# Patient Record
Sex: MALE | NOT HISPANIC OR LATINO | Employment: OTHER | URBAN - METROPOLITAN AREA
[De-identification: names, ages, dates, MRNs, and addresses within clinical notes are randomized per-mention and may not be internally consistent; named-entity substitution may affect disease eponyms.]

---

## 2022-08-05 ENCOUNTER — OFFICE VISIT (OUTPATIENT)
Dept: CARDIAC SURGERY | Facility: CLINIC | Age: 57
End: 2022-08-05

## 2022-08-05 VITALS
HEART RATE: 96 BPM | TEMPERATURE: 98.4 F | SYSTOLIC BLOOD PRESSURE: 130 MMHG | HEIGHT: 69 IN | BODY MASS INDEX: 24.2 KG/M2 | OXYGEN SATURATION: 99 % | DIASTOLIC BLOOD PRESSURE: 72 MMHG | WEIGHT: 163.4 LBS

## 2022-08-05 DIAGNOSIS — R59.0 MEDIASTINAL LYMPHADENOPATHY: ICD-10-CM

## 2022-08-05 DIAGNOSIS — R91.1 LUNG NODULE: Primary | ICD-10-CM

## 2022-08-05 DIAGNOSIS — Z00.6 EXAMINATION FOR NORMAL COMPARISON FOR CLINICAL RESEARCH: Primary | ICD-10-CM

## 2022-08-05 PROCEDURE — 99205 OFFICE O/P NEW HI 60 MIN: CPT | Performed by: THORACIC SURGERY (CARDIOTHORACIC VASCULAR SURGERY)

## 2022-08-05 RX ORDER — ASPIRIN 81 MG/1
81 TABLET, CHEWABLE ORAL DAILY
COMMUNITY

## 2022-08-05 NOTE — PROGRESS NOTES
2022  Patient Information  Wilner Senior                                                                                          [address] [place] [address]   1965  'PCP/Referring Physician'  Domenico Duran MD  [phone]  No ref. provider found    Chief Complaint   Patient presents with   • Consult     N/p per Dr. Mckenna for lung bx. Pt states that he had a PET scan that shows active spots in his chest here today for a consult       History of Present Illness:   The patient is a 57-year-old  male who is being referred for and abnormal PET scan. The patient had a screening for lung cancer due to his smoking history. He has been found to have mediastinal lymphadenopathy and small calcified and non calcified lung nodules. His PET scan is positive for hypermetabolic mediastinal hilar lymph nodes. He has a brother with a history of small cell and another brother who has gastric cancer. He has been referred for a biopsy at this time. He has multiple small nodules in his lungs and he is a smoker. He has smoked since the age of 15. He just recently quit, approximately 1 month ago. He denies, weight loss, hemoptysis or any other problems.     Patient Active Problem List   Diagnosis   • Lung nodule   • Mediastinal lymphadenopathy     Past Medical History:   Diagnosis Date   • Dyslipidemia    • Myocardial infarction (HCC)      Past Surgical History:   Procedure Laterality Date   • CORONARY ANGIOPLASTY     • CORONARY STENT PLACEMENT     • HERNIA REPAIR     • VASECTOMY         Current Outpatient Medications:   •  aspirin 81 MG chewable tablet, Chew 81 mg Daily., Disp: , Rfl:   No Known Allergies  Social History     Socioeconomic History   • Marital status:    Tobacco Use   • Smoking status: Former Smoker     Packs/day: 1.00     Years: 40.00     Pack years: 40.00     Quit date: 2022     Years since quittin.1   • Smokeless tobacco: Current User     Types: Chew   • Tobacco  "comment: Pt states that he has chewed tobacco ever since he was a small child   Substance and Sexual Activity   • Alcohol use: Yes     Alcohol/week: 1.0 standard drink     Types: 1 Cans of beer per week     Comment: 1 can of beer a daily   • Drug use: Never   • Sexual activity: Defer     Family History   Problem Relation Age of Onset   • Coronary artery disease Mother    • No Known Problems Father    • Cancer Brother      Review of Systems   Constitutional: Positive for malaise/fatigue. Negative for chills, decreased appetite, fever, weight gain and weight loss.   HENT: Negative for hearing loss.    Cardiovascular: Positive for dyspnea on exertion. Negative for chest pain, claudication, cyanosis, irregular heartbeat, leg swelling, near-syncope, orthopnea, palpitations, paroxysmal nocturnal dyspnea and syncope.   Endocrine: Negative for polydipsia, polyphagia and polyuria.   Hematologic/Lymphatic: Negative for adenopathy. Does not bruise/bleed easily.   Musculoskeletal: Positive for arthritis, back pain and muscle cramps (nuzhat horses). Negative for falls, gout, joint pain, joint swelling, muscle weakness and myalgias.   Gastrointestinal: Negative for abdominal pain, anorexia, dysphagia, heartburn, nausea and vomiting.   Genitourinary: Negative for dysuria and hematuria.   Neurological: Negative for dizziness, focal weakness, headaches, loss of balance, numbness, seizures, vertigo and weakness.   Psychiatric/Behavioral: Negative for altered mental status, depression, substance abuse and suicidal ideas. The patient is not nervous/anxious.    Allergic/Immunologic: Negative for HIV exposure and persistent infections.     Vitals:    08/05/22 0914   BP: 130/72   Pulse: 96   Temp: 98.4 °F (36.9 °C)   SpO2: 99%   Weight: 74.1 kg (163 lb 6.4 oz)   Height: 175.3 cm (69\")      Physical Exam   CONSTITUTIONAL:  Oriented x3, well-nourished, well developed, in no acute distress.  EYES:    Eyes anicteric.  EOMI.  PERRLA.   ENT:   "              Good dentition.  NECK:    Supple without masses or thyromegaly.  LUNGS:           Decreased in the bases; no wheezing, rales or rhonchi.  CARDIOVASCULAR:  Regular rate and rhythm without murmur, rub or gallop.  There are no carotid bruits.  GI:     Abdomen is soft, nontender, nondistended with normal bowel sounds.  No hepatosplenomegaly and no masses.  EXTREMITIES:   No cyanosis, clubbing or edema.  SKIN:   No ulcerations, petechiae or bruising.  MUSCULOSKELETAL:  Full range of motion with no deformity of extremities.  NEURO:  Cranial nerves II-XII, motor and sensory exams are intact.  PSYCH:  Alert and oriented; mood and affect good.    The ROS, past medical history, surgical history, family history, social history and vitals were reviewed by myself and corrected as needed.      Labs/Imaging:  I have obtained and reviewed the medical records from Dr. Mcgowan, including the PET scan demonstrating mediastinal lymphadenopathy.     Assessment/Plan:   The patient is a 57-year-old  male who presents mediastinal lymphadenopathy, which is hypermetabolic on PET scan. He has a strong family history of lung cancer in his uncles and a brother. I think in view of his history and hypermetabolic lymph nodes that we need to proceed with an EBUS procedure. I have explained the procedure, risks and alternatives. He appears to be fully informed and desires to proceed.     Patient Active Problem List   Diagnosis   • Lung nodule   • Mediastinal lymphadenopathy       CC: MD Valerie Alas MD Regina Fugate editing for Billy Quiles MD      I, Billy Quiles MD, have read and agree with the editing done by Dipika Estrada, .

## 2022-08-09 ENCOUNTER — HOSPITAL ENCOUNTER (OUTPATIENT)
Facility: HOSPITAL | Age: 57
Setting detail: HOSPITAL OUTPATIENT SURGERY
End: 2022-08-09
Attending: THORACIC SURGERY (CARDIOTHORACIC VASCULAR SURGERY) | Admitting: THORACIC SURGERY (CARDIOTHORACIC VASCULAR SURGERY)

## 2022-08-16 ENCOUNTER — PREP FOR SURGERY (OUTPATIENT)
Dept: OTHER | Facility: HOSPITAL | Age: 57
End: 2022-08-16

## 2022-08-16 DIAGNOSIS — R91.1 LUNG NODULE: Primary | ICD-10-CM

## 2022-08-22 ENCOUNTER — HOSPITAL ENCOUNTER (OUTPATIENT)
Dept: GENERAL RADIOLOGY | Facility: HOSPITAL | Age: 57
Discharge: HOME | End: 2022-08-22

## 2022-08-22 ENCOUNTER — PRE-ADMISSION TESTING (OUTPATIENT)
Dept: PREADMISSION TESTING | Facility: HOSPITAL | Age: 57
End: 2022-08-22

## 2022-08-22 VITALS — HEIGHT: 70 IN | BODY MASS INDEX: 23.7 KG/M2 | WEIGHT: 165.57 LBS

## 2022-08-22 DIAGNOSIS — R91.1 LUNG NODULE: ICD-10-CM

## 2022-08-22 LAB
ALBUMIN SERPL-MCNC: 4.3 G/DL (ref 3.5–5.2)
ALBUMIN/GLOB SERPL: 1.3 G/DL
ALP SERPL-CCNC: 87 U/L (ref 39–117)
ALT SERPL W P-5'-P-CCNC: 32 U/L (ref 1–41)
ANION GAP SERPL CALCULATED.3IONS-SCNC: 11 MMOL/L (ref 5–15)
APTT PPP: 41.7 SECONDS (ref 22–39)
AST SERPL-CCNC: 16 U/L (ref 1–40)
BILIRUB SERPL-MCNC: 0.4 MG/DL (ref 0–1.2)
BUN SERPL-MCNC: 16 MG/DL (ref 6–20)
BUN/CREAT SERPL: 13.8 (ref 7–25)
CALCIUM SPEC-SCNC: 9.6 MG/DL (ref 8.6–10.5)
CHLORIDE SERPL-SCNC: 103 MMOL/L (ref 98–107)
CO2 SERPL-SCNC: 26 MMOL/L (ref 22–29)
CREAT SERPL-MCNC: 1.16 MG/DL (ref 0.76–1.27)
DEPRECATED RDW RBC AUTO: 39.8 FL (ref 37–54)
EGFRCR SERPLBLD CKD-EPI 2021: 73.5 ML/MIN/1.73
ERYTHROCYTE [DISTWIDTH] IN BLOOD BY AUTOMATED COUNT: 12.1 % (ref 12.3–15.4)
GLOBULIN UR ELPH-MCNC: 3.4 GM/DL
GLUCOSE SERPL-MCNC: 88 MG/DL (ref 65–99)
HBA1C MFR BLD: 5.3 % (ref 4.8–5.6)
HCT VFR BLD AUTO: 41.9 % (ref 37.5–51)
HGB BLD-MCNC: 14.8 G/DL (ref 13–17.7)
INR PPP: 1.01 (ref 0.84–1.13)
MCH RBC QN AUTO: 31.8 PG (ref 26.6–33)
MCHC RBC AUTO-ENTMCNC: 35.3 G/DL (ref 31.5–35.7)
MCV RBC AUTO: 89.9 FL (ref 79–97)
PLATELET # BLD AUTO: 233 10*3/MM3 (ref 140–450)
PMV BLD AUTO: 8.5 FL (ref 6–12)
POTASSIUM SERPL-SCNC: 3.8 MMOL/L (ref 3.5–5.2)
PROT SERPL-MCNC: 7.7 G/DL (ref 6–8.5)
PROTHROMBIN TIME: 13.2 SECONDS (ref 11.4–14.4)
QT INTERVAL: 362 MS
QTC INTERVAL: 404 MS
RBC # BLD AUTO: 4.66 10*6/MM3 (ref 4.14–5.8)
SODIUM SERPL-SCNC: 140 MMOL/L (ref 136–145)
WBC NRBC COR # BLD: 7.74 10*3/MM3 (ref 3.4–10.8)

## 2022-08-22 PROCEDURE — 71046 X-RAY EXAM CHEST 2 VIEWS: CPT

## 2022-08-22 PROCEDURE — 85027 COMPLETE CBC AUTOMATED: CPT

## 2022-08-22 PROCEDURE — 85730 THROMBOPLASTIN TIME PARTIAL: CPT

## 2022-08-22 PROCEDURE — 93005 ELECTROCARDIOGRAM TRACING: CPT

## 2022-08-22 PROCEDURE — 83036 HEMOGLOBIN GLYCOSYLATED A1C: CPT

## 2022-08-22 PROCEDURE — 85610 PROTHROMBIN TIME: CPT

## 2022-08-22 PROCEDURE — 80053 COMPREHEN METABOLIC PANEL: CPT

## 2022-08-22 PROCEDURE — 93010 ELECTROCARDIOGRAM REPORT: CPT | Performed by: INTERNAL MEDICINE

## 2022-08-22 PROCEDURE — 36415 COLL VENOUS BLD VENIPUNCTURE: CPT

## 2022-08-22 NOTE — PAT
An arrival time for procedure was not given during PAT visit. If patient had any questions or concerns about their arrival time, they were instructed to contact their surgeon/physician.  Additionally, if the patient referred to an arrival time that was acquired from their my chart account, patient was encouraged to verify that time with their surgeon/physician.  NO arrival times given in Pre Admission Testing Department.

## 2022-08-24 ENCOUNTER — ANESTHESIA (OUTPATIENT)
Dept: GASTROENTEROLOGY | Facility: HOSPITAL | Age: 57
End: 2022-08-24

## 2022-08-24 ENCOUNTER — ANESTHESIA EVENT (OUTPATIENT)
Dept: GASTROENTEROLOGY | Facility: HOSPITAL | Age: 57
End: 2022-08-24

## 2022-08-24 RX ORDER — SODIUM CHLORIDE 0.9 % (FLUSH) 0.9 %
10 SYRINGE (ML) INJECTION AS NEEDED
Status: CANCELLED | OUTPATIENT
Start: 2022-08-24

## 2022-08-24 RX ORDER — SODIUM CHLORIDE, SODIUM LACTATE, POTASSIUM CHLORIDE, CALCIUM CHLORIDE 600; 310; 30; 20 MG/100ML; MG/100ML; MG/100ML; MG/100ML
9 INJECTION, SOLUTION INTRAVENOUS CONTINUOUS
Status: CANCELLED | OUTPATIENT
Start: 2022-08-24

## 2022-08-24 RX ORDER — FAMOTIDINE 10 MG/ML
20 INJECTION, SOLUTION INTRAVENOUS ONCE
Status: CANCELLED | OUTPATIENT
Start: 2022-08-24 | End: 2022-08-24

## 2022-08-24 RX ORDER — FAMOTIDINE 20 MG/1
20 TABLET, FILM COATED ORAL ONCE
Status: CANCELLED | OUTPATIENT
Start: 2022-08-24 | End: 2022-08-24

## 2022-08-24 RX ORDER — MIDAZOLAM HYDROCHLORIDE 1 MG/ML
1 INJECTION INTRAMUSCULAR; INTRAVENOUS
Status: CANCELLED | OUTPATIENT
Start: 2022-08-24

## 2022-08-24 RX ORDER — SODIUM CHLORIDE 0.9 % (FLUSH) 0.9 %
10 SYRINGE (ML) INJECTION EVERY 12 HOURS SCHEDULED
Status: CANCELLED | OUTPATIENT
Start: 2022-08-24

## 2022-08-24 RX ORDER — LIDOCAINE HYDROCHLORIDE 10 MG/ML
0.5 INJECTION, SOLUTION EPIDURAL; INFILTRATION; INTRACAUDAL; PERINEURAL ONCE AS NEEDED
Status: CANCELLED | OUTPATIENT
Start: 2022-08-24

## 2022-08-25 ENCOUNTER — HOSPITAL ENCOUNTER (OUTPATIENT)
Facility: HOSPITAL | Age: 57
Setting detail: HOSPITAL OUTPATIENT SURGERY
Discharge: HOME | End: 2022-08-25
Attending: STUDENT IN AN ORGANIZED HEALTH CARE EDUCATION/TRAINING PROGRAM | Admitting: STUDENT IN AN ORGANIZED HEALTH CARE EDUCATION/TRAINING PROGRAM

## 2022-08-25 ENCOUNTER — APPOINTMENT (OUTPATIENT)
Dept: GENERAL RADIOLOGY | Facility: HOSPITAL | Age: 57
End: 2022-08-25

## 2022-08-25 VITALS
SYSTOLIC BLOOD PRESSURE: 157 MMHG | DIASTOLIC BLOOD PRESSURE: 93 MMHG | TEMPERATURE: 97.1 F | RESPIRATION RATE: 16 BRPM | HEART RATE: 84 BPM | OXYGEN SATURATION: 97 %

## 2022-08-25 DIAGNOSIS — R91.1 LUNG NODULE: ICD-10-CM

## 2022-08-25 PROCEDURE — 31653 BRONCH EBUS SAMPLNG 3/> NODE: CPT | Performed by: STUDENT IN AN ORGANIZED HEALTH CARE EDUCATION/TRAINING PROGRAM

## 2022-08-25 PROCEDURE — 88333 PATH CONSLTJ SURG CYTO XM 1: CPT | Performed by: STUDENT IN AN ORGANIZED HEALTH CARE EDUCATION/TRAINING PROGRAM

## 2022-08-25 PROCEDURE — 25010000002 DEXAMETHASONE PER 1 MG: Performed by: NURSE ANESTHETIST, CERTIFIED REGISTERED

## 2022-08-25 PROCEDURE — 25010000002 PROPOFOL 10 MG/ML EMULSION: Performed by: NURSE ANESTHETIST, CERTIFIED REGISTERED

## 2022-08-25 PROCEDURE — 25010000002 FENTANYL CITRATE (PF) 50 MCG/ML SOLUTION: Performed by: NURSE ANESTHETIST, CERTIFIED REGISTERED

## 2022-08-25 PROCEDURE — S0260 H&P FOR SURGERY: HCPCS | Performed by: STUDENT IN AN ORGANIZED HEALTH CARE EDUCATION/TRAINING PROGRAM

## 2022-08-25 PROCEDURE — 71045 X-RAY EXAM CHEST 1 VIEW: CPT

## 2022-08-25 PROCEDURE — 88305 TISSUE EXAM BY PATHOLOGIST: CPT | Performed by: STUDENT IN AN ORGANIZED HEALTH CARE EDUCATION/TRAINING PROGRAM

## 2022-08-25 PROCEDURE — 25010000002 ONDANSETRON PER 1 MG: Performed by: NURSE ANESTHETIST, CERTIFIED REGISTERED

## 2022-08-25 PROCEDURE — C1726 CATH, BAL DIL, NON-VASCULAR: HCPCS | Performed by: STUDENT IN AN ORGANIZED HEALTH CARE EDUCATION/TRAINING PROGRAM

## 2022-08-25 PROCEDURE — 88172 CYTP DX EVAL FNA 1ST EA SITE: CPT | Performed by: STUDENT IN AN ORGANIZED HEALTH CARE EDUCATION/TRAINING PROGRAM

## 2022-08-25 RX ORDER — FAMOTIDINE 10 MG/ML
20 INJECTION, SOLUTION INTRAVENOUS ONCE
Status: COMPLETED | OUTPATIENT
Start: 2022-08-25 | End: 2022-08-25

## 2022-08-25 RX ORDER — SODIUM CHLORIDE 0.9 % (FLUSH) 0.9 %
10 SYRINGE (ML) INJECTION EVERY 12 HOURS SCHEDULED
Status: DISCONTINUED | OUTPATIENT
Start: 2022-08-25 | End: 2022-08-25 | Stop reason: HOSPADM

## 2022-08-25 RX ORDER — SODIUM CHLORIDE, SODIUM LACTATE, POTASSIUM CHLORIDE, CALCIUM CHLORIDE 600; 310; 30; 20 MG/100ML; MG/100ML; MG/100ML; MG/100ML
9 INJECTION, SOLUTION INTRAVENOUS CONTINUOUS
Status: CANCELLED | OUTPATIENT
Start: 2022-08-25

## 2022-08-25 RX ORDER — ONDANSETRON 2 MG/ML
INJECTION INTRAMUSCULAR; INTRAVENOUS AS NEEDED
Status: DISCONTINUED | OUTPATIENT
Start: 2022-08-25 | End: 2022-08-25 | Stop reason: SURG

## 2022-08-25 RX ORDER — SODIUM CHLORIDE 0.9 % (FLUSH) 0.9 %
10 SYRINGE (ML) INJECTION AS NEEDED
Status: DISCONTINUED | OUTPATIENT
Start: 2022-08-25 | End: 2022-08-25

## 2022-08-25 RX ORDER — HYDROMORPHONE HYDROCHLORIDE 1 MG/ML
0.5 INJECTION, SOLUTION INTRAMUSCULAR; INTRAVENOUS; SUBCUTANEOUS
Status: CANCELLED | OUTPATIENT
Start: 2022-08-25

## 2022-08-25 RX ORDER — SODIUM CHLORIDE 0.9 % (FLUSH) 0.9 %
10 SYRINGE (ML) INJECTION AS NEEDED
Status: DISCONTINUED | OUTPATIENT
Start: 2022-08-25 | End: 2022-08-25 | Stop reason: HOSPADM

## 2022-08-25 RX ORDER — SODIUM CHLORIDE 0.9 % (FLUSH) 0.9 %
10 SYRINGE (ML) INJECTION EVERY 12 HOURS SCHEDULED
Status: CANCELLED | OUTPATIENT
Start: 2022-08-25

## 2022-08-25 RX ORDER — LIDOCAINE HYDROCHLORIDE 10 MG/ML
0.5 INJECTION, SOLUTION EPIDURAL; INFILTRATION; INTRACAUDAL; PERINEURAL ONCE AS NEEDED
Status: CANCELLED | OUTPATIENT
Start: 2022-08-25

## 2022-08-25 RX ORDER — FENTANYL CITRATE 50 UG/ML
50 INJECTION, SOLUTION INTRAMUSCULAR; INTRAVENOUS
Status: CANCELLED | OUTPATIENT
Start: 2022-08-25

## 2022-08-25 RX ORDER — MIDAZOLAM HYDROCHLORIDE 1 MG/ML
1 INJECTION INTRAMUSCULAR; INTRAVENOUS
Status: DISCONTINUED | OUTPATIENT
Start: 2022-08-25 | End: 2022-08-25

## 2022-08-25 RX ORDER — FAMOTIDINE 20 MG/1
20 TABLET, FILM COATED ORAL ONCE
Status: CANCELLED | OUTPATIENT
Start: 2022-08-25 | End: 2022-08-25

## 2022-08-25 RX ORDER — SODIUM CHLORIDE 9 MG/ML
9 INJECTION, SOLUTION INTRAVENOUS CONTINUOUS
Status: DISCONTINUED | OUTPATIENT
Start: 2022-08-25 | End: 2022-08-25 | Stop reason: HOSPADM

## 2022-08-25 RX ORDER — MIDAZOLAM HYDROCHLORIDE 1 MG/ML
1 INJECTION INTRAMUSCULAR; INTRAVENOUS
Status: DISCONTINUED | OUTPATIENT
Start: 2022-08-25 | End: 2022-08-25 | Stop reason: SDUPTHER

## 2022-08-25 RX ORDER — LIDOCAINE HYDROCHLORIDE 10 MG/ML
0.5 INJECTION, SOLUTION EPIDURAL; INFILTRATION; INTRACAUDAL; PERINEURAL ONCE AS NEEDED
Status: DISCONTINUED | OUTPATIENT
Start: 2022-08-25 | End: 2022-08-25 | Stop reason: HOSPADM

## 2022-08-25 RX ORDER — FENTANYL CITRATE 50 UG/ML
INJECTION, SOLUTION INTRAMUSCULAR; INTRAVENOUS AS NEEDED
Status: DISCONTINUED | OUTPATIENT
Start: 2022-08-25 | End: 2022-08-25 | Stop reason: SURG

## 2022-08-25 RX ORDER — PROPOFOL 10 MG/ML
VIAL (ML) INTRAVENOUS AS NEEDED
Status: DISCONTINUED | OUTPATIENT
Start: 2022-08-25 | End: 2022-08-25 | Stop reason: SURG

## 2022-08-25 RX ORDER — LIDOCAINE HYDROCHLORIDE 10 MG/ML
INJECTION, SOLUTION EPIDURAL; INFILTRATION; INTRACAUDAL; PERINEURAL AS NEEDED
Status: DISCONTINUED | OUTPATIENT
Start: 2022-08-25 | End: 2022-08-25 | Stop reason: SURG

## 2022-08-25 RX ORDER — EPHEDRINE SULFATE 50 MG/ML
INJECTION, SOLUTION INTRAVENOUS AS NEEDED
Status: DISCONTINUED | OUTPATIENT
Start: 2022-08-25 | End: 2022-08-25 | Stop reason: SURG

## 2022-08-25 RX ORDER — ROCURONIUM BROMIDE 10 MG/ML
INJECTION, SOLUTION INTRAVENOUS AS NEEDED
Status: DISCONTINUED | OUTPATIENT
Start: 2022-08-25 | End: 2022-08-25 | Stop reason: SURG

## 2022-08-25 RX ORDER — DEXAMETHASONE SODIUM PHOSPHATE 4 MG/ML
INJECTION, SOLUTION INTRA-ARTICULAR; INTRALESIONAL; INTRAMUSCULAR; INTRAVENOUS; SOFT TISSUE AS NEEDED
Status: DISCONTINUED | OUTPATIENT
Start: 2022-08-25 | End: 2022-08-25 | Stop reason: SURG

## 2022-08-25 RX ADMIN — PROPOFOL 150 MG: 10 INJECTION, EMULSION INTRAVENOUS at 11:05

## 2022-08-25 RX ADMIN — Medication 10 ML: at 09:23

## 2022-08-25 RX ADMIN — FENTANYL CITRATE 100 MCG: 50 INJECTION, SOLUTION INTRAMUSCULAR; INTRAVENOUS at 11:05

## 2022-08-25 RX ADMIN — EPHEDRINE SULFATE 15 MG: 50 INJECTION, SOLUTION INTRAVENOUS at 11:48

## 2022-08-25 RX ADMIN — ONDANSETRON 4 MG: 2 INJECTION INTRAMUSCULAR; INTRAVENOUS at 11:09

## 2022-08-25 RX ADMIN — FAMOTIDINE 20 MG: 10 INJECTION INTRAVENOUS at 09:23

## 2022-08-25 RX ADMIN — DEXAMETHASONE SODIUM PHOSPHATE 4 MG: 4 INJECTION, SOLUTION INTRA-ARTICULAR; INTRALESIONAL; INTRAMUSCULAR; INTRAVENOUS; SOFT TISSUE at 11:09

## 2022-08-25 RX ADMIN — SUGAMMADEX 200 MG: 100 INJECTION, SOLUTION INTRAVENOUS at 12:24

## 2022-08-25 RX ADMIN — ROCURONIUM BROMIDE 50 MG: 50 INJECTION, SOLUTION INTRAVENOUS at 11:05

## 2022-08-25 RX ADMIN — LIDOCAINE HYDROCHLORIDE 50 MG: 10 INJECTION, SOLUTION EPIDURAL; INFILTRATION; INTRACAUDAL; PERINEURAL at 11:05

## 2022-08-25 RX ADMIN — EPHEDRINE SULFATE 10 MG: 50 INJECTION, SOLUTION INTRAVENOUS at 12:15

## 2022-08-25 RX ADMIN — SODIUM CHLORIDE 9 ML/HR: 9 INJECTION, SOLUTION INTRAVENOUS at 09:23

## 2022-08-25 RX ADMIN — ROCURONIUM BROMIDE 10 MG: 50 INJECTION, SOLUTION INTRAVENOUS at 12:20

## 2022-08-25 NOTE — BRIEF OP NOTE
BRONCHOSCOPY WITH ENDOBRONCHIAL ULTRASOUND  Progress Note    Wilner Senior  8/25/2022    Pre-op Diagnosis:   Lung nodule [R91.1]       Post-Op Diagnosis Codes:     * Lung nodule [R91.1]    Procedure/CPT® Codes:        Procedure(s):  BRONCHOSCOPY WITH ENDOBRONCHIAL ULTRASOUND    Surgeon(s):  Bay Mcgowan MD    Anesthesia: General    Staff:   Circulator: Marisa Joy RN  Endo Technician: Kiya Daily         Estimated Blood Loss: minimal    Urine Voided: * No values recorded between 8/25/2022 11:00 AM and 8/25/2022 11:07 AM *    Specimens:                A: 10L   11R   7  4L  4R  2R  10R          Drains: * No LDAs found *    Findings: Benign LNs on frozen section        Complications: none          Bay Mcgowan MD     Date: 8/25/2022  Time: 11:07 EDT

## 2022-08-25 NOTE — OP NOTE
Date of procedure August 25, 2022    Preoperative diagnosis  Smoking style lymphadenopathy  Lung nodules  PET avid hypermetabolic mediastinal hilar lymph nodes    Postoperative diagnosis  Same    Procedure  Endobronchial ultrasound  Mediastinal lymph node biopsies    Surgeon  Bay Mcgowan MD    Assistant  None    Estimated blood loss  Minimal    Complications  None    Indications  This is a 57-year-old  man with a substantial history of smoking who was referred to my partner Billy Quiles MD with concern for new lung nodules and PET avid hypermetabolic mediastinal hilar lymph nodes.  He has a strong family history of cancer.  Due to concern for cancer, the recommendation was made for the patient undergo mediastinal lymph node staging with endobronchial ultrasound.  I explained the risks, benefits, and alternatives of the procedure including risks of bleeding, infection, lung injury, permanent disability, and even death.  The patient demonstrated good understanding and signed written consent.    Operative findings  Normal bronchoscopy without endoluminal mass or bleeding.  Endobronchial ultrasound was performed and yielded multiple samples of benign lymph node tissue on frozen section analysis by pathology from stations 10L, 11R, 7, 4L, 4R, 2R, and 10R in that order.    Procedure in detail  The patient was identified in the preoperative area and brought to the procedural area in the bronchoscopy suite.  An appropriate timeout was performed in the presence of the OR team.  He underwent general endotracheal anesthesia which was managed by the anesthesiologist.  At that point we proceeded with flexible bronchoscopy which was normal.  We then proceeded with endobronchial ultrasound and were able to sample lymph node tissue from the aforementioned stations.  Samples were sent to pathology for immediate assessment.  The pathologist immediate assessment was that this was benign tissue without sign of cancer  with the exception of 4L and 2R which did not reveal any lymphatic tissue.  The patient was awakened from general anesthesia without issue and there were no complications.  All instrument counts were correct.  I was present for the entire procedure.    Bay Mcgowan M.D.  Cardiothoracic and Vascular Surgeon  [facility]

## 2022-08-25 NOTE — ANESTHESIA POSTPROCEDURE EVALUATION
Patient: Wilner Senior    Procedure Summary     Date: 08/25/22 Room / Location:  [facility]    Anesthesia Start: 1101 Anesthesia Stop: 1238    Procedure: BRONCHOSCOPY WITH ENDOBRONCHIAL ULTRASOUND (N/A Bronchus) Diagnosis:       Lung nodule      (Lung nodule [R91.1])    Surgeons: Bay Mcgowan MD Provider: Brady Hill MD    Anesthesia Type: general ASA Status: 3          Anesthesia Type: general    Vitals  Vitals Value Taken Time   /88 08/25/22 1235   Temp 87.1 °F (30.6 °C) 08/25/22 1235   Pulse 95 08/25/22 1237   Resp 18 08/25/22 1235   SpO2 98 % 08/25/22 1237   Vitals shown include unvalidated device data.        Post Anesthesia Care and Evaluation    Patient location during evaluation: PACU  Patient participation: complete - patient participated  Level of consciousness: awake and alert  Pain management: adequate    Airway patency: patent  Anesthetic complications: No anesthetic complications  PONV Status: none  Cardiovascular status: hemodynamically stable and acceptable  Respiratory status: nonlabored ventilation, acceptable and nasal cannula  Hydration status: acceptable

## 2022-08-25 NOTE — ANESTHESIA PROCEDURE NOTES
Airway  Urgency: elective    Date/Time: 8/25/2022 11:06 AM  Airway not difficult    General Information and Staff    Patient location during procedure: OR  CRNA/CAA: Terrell Carreon III, CRNA    Indications and Patient Condition  Indications for airway management: airway protection    Preoxygenated: yes  MILS not maintained throughout  Mask difficulty assessment: 0 - not attempted    Final Airway Details  Final airway type: endotracheal airway      Successful airway: ETT  Cuffed: yes   Successful intubation technique: direct laryngoscopy  Blade: Mariza  Blade size: 3  ETT size (mm): 9.0  Cormack-Lehane Classification: grade I - full view of glottis  Placement verified by: chest auscultation and capnometry   Measured from: lips  ETT/EBT  to lips (cm): 19  Number of attempts at approach: 1  Assessment: lips, teeth, and gum same as pre-op and atraumatic intubation    Additional Comments  Negative epigastric sounds, Breath sound equal bilaterally with symmetric chest rise and fall.

## 2022-08-25 NOTE — ANESTHESIA PREPROCEDURE EVALUATION
Anesthesia Evaluation     Patient summary reviewed and Nursing notes reviewed                Airway   Mallampati: II  TM distance: >3 FB  Neck ROM: full  No difficulty expected  Dental - normal exam     Comment: POOR DENTITION    Pulmonary - normal exam   (+) a smoker Former,   Cardiovascular - normal exam    (+) hypertension, past MI , CAD, cardiac stents       Neuro/Psych- negative ROS  GI/Hepatic/Renal/Endo - negative ROS     Musculoskeletal (-) negative ROS    Abdominal  - normal exam    Bowel sounds: normal.   Substance History   (+) alcohol use,      OB/GYN negative ob/gyn ROS         Other                      Anesthesia Plan    ASA 3     general     intravenous induction     Anesthetic plan, risks, benefits, and alternatives have been provided, discussed and informed consent has been obtained with: patient.    Plan discussed with CRNA.        CODE STATUS:

## 2022-08-26 LAB
CYTO UR: NORMAL
LAB AP CASE REPORT: NORMAL
LAB AP CLINICAL INFORMATION: NORMAL
Lab: NORMAL
PATH REPORT.FINAL DX SPEC: NORMAL
PATH REPORT.GROSS SPEC: NORMAL

## 2022-08-26 NOTE — H&P
CTS History & Physical         Patient Care Team:  Domenico Duran MD as PCP - General (Family Medicine)    Chief complaint lung nodules    HPI  Patient is a 57 y.o. male presents with new lung nodules.     Review of Systems    A comprehensive review of systems was negative     History  Past Medical History:   Diagnosis Date   • Abnormal CT lung screening 2022   • Coronary artery disease     1 stent   • Dyslipidemia    • Hypertension     mild; no meds required   • Myocardial infarction (HCC) 2018    1 stent; does not see a cardiologist     Past Surgical History:   Procedure Laterality Date   • COLONOSCOPY     • CORONARY ANGIOPLASTY     • CORONARY STENT PLACEMENT  2018   • INGUINAL HERNIA REPAIR Right    • VASECTOMY       Family History   Problem Relation Age of Onset   • Coronary artery disease Mother    • No Known Problems Father    • Cancer Brother      Social History     Tobacco Use   • Smoking status: Former Smoker     Packs/day: 1.00     Years: 40.00     Pack years: 40.00     Quit date: 2022     Years since quittin.2   • Smokeless tobacco: Current User     Types: Chew   • Tobacco comment: Pt states that he has chewed tobacco ever since he was a small child   Vaping Use   • Vaping Use: Never used   Substance Use Topics   • Alcohol use: Yes     Alcohol/week: 7.0 standard drinks     Types: 7 Cans of beer per week     Comment: 1 can of beer a daily   • Drug use: Never     No medications prior to admission.     Allergies:  Patient has no known allergies.      Objective    Vital Signs  Temp:  [97.1 °F (36.2 °C)-97.8 °F (36.6 °C)] 97.1 °F (36.2 °C)  Heart Rate:  [74-96] 84  Resp:  [12-18] 16  BP: (140-172)/() 157/93      Physical Exam:  Normal exam    Results:   Results from last 7 days   Lab Units 22  1505   WBC 10*3/mm3 7.74   HEMOGLOBIN g/dL 14.8   HEMATOCRIT % 41.9   PLATELETS 10*3/mm3 233     Results from last 7 days   Lab Units 22  1505   SODIUM mmol/L 140   POTASSIUM mmol/L  3.8   CHLORIDE mmol/L 103   CO2 mmol/L 26.0   BUN mg/dL 16   CREATININE mg/dL 1.16   GLUCOSE mg/dL 88   CALCIUM mg/dL 9.6         Coagulation: No results found for: INR, APTT  Cardiac markers:     ABGs:       Invalid input(s): PO2        Imaging Results (Last 72 Hours)     Procedure Component Value Units Date/Time    XR Chest 1 View [[id number]] Collected: 08/25/22 1330     Updated: 08/25/22 1333    Narrative:         DATE OF EXAM:   8/25/2022 1:05 PM     PROCEDURE:   XR CHEST 1 VW-     INDICATIONS:   post procedure; R91.1-Solitary pulmonary nodule     COMPARISON:  8/22/2022 at 3:43 PM     TECHNIQUE:   [Portable chest radiograph]     FINDINGS:  No new consolidations or pleural effusions are observed. The cardiac  silhouette and mediastinum are stable. No acute osseous abnormalities  are identified.       Impression:      There is no significant change when compared to the prior study. There  is no evidence for acute cardiopulmonary process.     This report was finalized on 8/25/2022 1:30 PM by Mark Talbert MD.             Assessment      * No active hospital problems. *    As above         Plan  CANDICE Mcgowan MD  08/25/22  20:53 EDT

## 2022-09-08 ENCOUNTER — OFFICE VISIT (OUTPATIENT)
Dept: CARDIAC SURGERY | Facility: CLINIC | Age: 57
End: 2022-09-08

## 2022-09-08 VITALS
HEIGHT: 69 IN | DIASTOLIC BLOOD PRESSURE: 82 MMHG | BODY MASS INDEX: 24.47 KG/M2 | WEIGHT: 165.2 LBS | HEART RATE: 93 BPM | SYSTOLIC BLOOD PRESSURE: 154 MMHG | TEMPERATURE: 99.1 F | OXYGEN SATURATION: 99 %

## 2022-09-08 DIAGNOSIS — R59.0 MEDIASTINAL LYMPHADENOPATHY: Primary | ICD-10-CM

## 2022-09-08 DIAGNOSIS — R91.1 LUNG NODULE: ICD-10-CM

## 2022-09-08 PROCEDURE — 99213 OFFICE O/P EST LOW 20 MIN: CPT | Performed by: NURSE PRACTITIONER

## 2022-09-08 RX ORDER — CIPROFLOXACIN 500 MG/1
TABLET, FILM COATED ORAL
COMMUNITY
Start: 2022-09-02

## 2022-09-08 NOTE — PROGRESS NOTES
[facility] Cardiothoracic Surgery Office Follow Up Note     Date of Encounter: 2022     Name: Wilner Senior  : 1965     Referred By: No ref. provider found  PCP: Domenico Duran MD    Chief Complaint:    Chief Complaint   Patient presents with   • Lung Nodule     Hospital follow up s/p EBUS        Subjective      History of Present Illness:    Wilner Senior is a 57 y.o. male former smoker (quit 2022), with a history of hyperlipidemia and CAD s/p stenting.  Patient was evaluated by Dr. Quiles 2022 and referral from Dr. Mckenna for further evaluation of mediastinal lymphadenopathy.  Patient had annual lung cancer screening performed revealing small calcified and noncalcified lung nodules as well as mediastinal lymphadenopathy and had subsequent PET scan performed with hypermetabolic mediastinal hilar lymph nodes.  Patient has now subsequently underwent EBUS with Dr. Cervantes on 2022 with no postprocedure complications.  Patient presents today for follow-up to discuss pathology results.  Patient denies any worsening SOA, cough, fatigue, unintentional weight loss, lymphadenopathy, hemoptysis, fevers, or chills.    Review of Systems:  Review of Systems   Constitutional: Negative for chills, decreased appetite, diaphoresis, fever, malaise/fatigue, night sweats, weight gain and weight loss.   HENT: Negative for hoarse voice.    Eyes: Negative for blurred vision, double vision and visual disturbance.   Cardiovascular: Negative for chest pain, claudication, dyspnea on exertion, irregular heartbeat, leg swelling, near-syncope, orthopnea, palpitations, paroxysmal nocturnal dyspnea and syncope.   Respiratory: Negative for cough, hemoptysis, shortness of breath, sputum production and wheezing.    Hematologic/Lymphatic: Negative for adenopathy and bleeding problem. Does not bruise/bleed easily.   Skin: Negative for color change, nail changes, poor wound healing and rash.    Musculoskeletal: Negative for back pain, falls and muscle cramps.   Gastrointestinal: Negative for abdominal pain, dysphagia and heartburn.   Genitourinary: Negative for flank pain.   Neurological: Negative for brief paralysis, disturbances in coordination, dizziness, focal weakness, headaches, light-headedness, loss of balance, numbness, paresthesias, sensory change, vertigo and weakness.   Psychiatric/Behavioral: Negative for depression and suicidal ideas.   Allergic/Immunologic: Negative for persistent infections.       I have reviewed the following portions of the patient's history: allergies, current medications, past family history, past medical history, past social history, past surgical history, problem list and ROS and confirm it's accurate.    Allergies:  No Known Allergies    Medications:      Current Outpatient Medications:   •  aspirin 81 MG chewable tablet, Chew 81 mg Daily., Disp: , Rfl:   •  ciprofloxacin (CIPRO) 500 MG tablet, TAKE ONE (1) TABLET EVERY 12 HOURS BY ORAL ROUTE FOR 10 DAYS., Disp: , Rfl:     History:   Past Medical History:   Diagnosis Date   • Abnormal CT lung screening 2022   • Coronary artery disease     1 stent   • Dyslipidemia    • Hypertension     mild; no meds required   • Myocardial infarction (HCC) 2018    1 stent; does not see a cardiologist       Past Surgical History:   Procedure Laterality Date   • BRONCHOSCOPY N/A 2022    Procedure: BRONCHOSCOPY WITH ENDOBRONCHIAL ULTRASOUND;  Surgeon: Bay Mcgowan MD;  Location: [facility] ENDOSCOPY;  Service: Cardiothoracic;  Laterality: N/A;   • COLONOSCOPY     • CORONARY ANGIOPLASTY     • CORONARY STENT PLACEMENT  2018   • INGUINAL HERNIA REPAIR Right    • VASECTOMY         Social History     Socioeconomic History   • Marital status:    Tobacco Use   • Smoking status: Former Smoker     Packs/day: 1.00     Years: 40.00     Pack years: 40.00     Quit date: 2022     Years since quittin.2   • Smokeless tobacco:  "Current User     Types: Chew   • Tobacco comment: Pt states that he has chewed tobacco ever since he was a small child   Vaping Use   • Vaping Use: Never used   Substance and Sexual Activity   • Alcohol use: Yes     Alcohol/week: 7.0 standard drinks     Types: 7 Cans of beer per week     Comment: 1 can of beer a daily   • Drug use: Never   • Sexual activity: Defer        Family History   Problem Relation Age of Onset   • Coronary artery disease Mother    • No Known Problems Father    • Cancer Brother        Objective     Physical Exam:  Vitals:    09/08/22 1522   BP: 154/82   BP Location: Right arm   Patient Position: Sitting   Pulse: 93   Temp: 99.1 °F (37.3 °C)   SpO2: 99%   Weight: 74.9 kg (165 lb 3.2 oz)   Height: 175.3 cm (69\")      Body mass index is 24.4 kg/m².    Physical Exam  Vitals and nursing note reviewed.   Constitutional:       Appearance: Normal appearance. He is well-developed.   HENT:      Head: Normocephalic and atraumatic.   Eyes:      Pupils: Pupils are equal, round, and reactive to light.   Neck:      Vascular: No carotid bruit.   Cardiovascular:      Rate and Rhythm: Normal rate and regular rhythm.      Pulses: Normal pulses.      Heart sounds: Normal heart sounds, S1 normal and S2 normal. No murmur heard.  Pulmonary:      Effort: Pulmonary effort is normal.      Breath sounds: Normal breath sounds.   Abdominal:      Palpations: Abdomen is soft.   Musculoskeletal:         General: No swelling.      Cervical back: Neck supple.      Right lower leg: No edema.      Left lower leg: No edema.   Skin:     General: Skin is warm and dry.      Capillary Refill: Capillary refill takes less than 2 seconds.      Findings: No bruising.   Neurological:      General: No focal deficit present.      Mental Status: He is alert and oriented to person, place, and time. Mental status is at baseline.      GCS: GCS eye subscore is 4. GCS verbal subscore is 5. GCS motor subscore is 6.      Motor: Motor function is " intact.      Coordination: Coordination is intact.      Gait: Gait is intact.   Psychiatric:         Mood and Affect: Mood normal.         Speech: Speech normal.         Behavior: Behavior normal. Behavior is cooperative.         Cognition and Memory: Cognition normal.         Imaging/Labs:    XR Chest 1 View    Result Date: 8/25/2022  There is no significant change when compared to the prior study. There is no evidence for acute cardiopulmonary process.  This report was finalized on 8/25/2022 1:30 PM by Mark Talbert MD.      Chest X-Ray PA & Lateral    Result Date: 8/22/2022  No active disease  This report was finalized on 8/22/2022 3:40 PM by Merrill Meier MD.       EBUS 8/25/22:  Final Diagnosis   1. LYMPH NODE, STATION 10 L, TRANSBRONCHIAL NEEDLE ASPIRATE AND CELL BLOCK:  Negative for carcinoma; lymphoid tissue present.  2.   LYMPH NODE, STATION 11 R, TRANSBRONCHIAL NEEDLE ASPIRATE:  Negative for carcinoma; lymphoid tissue with granulomatous inflammation.  3.   LYMPH NODE, STATION 7,  TRANSBRONCHIAL NEEDLE ASPIRATE:  Negative for carcinoma; lymphoid tissue present.  4.   LYMPH NODE, 2R, TRANSBRONCHIAL NEEDLE ASPIRATE:  Negative for carcinoma; lymphoid tissue present.  5.   LYMPH NODE, 4L, TRANSBRONCHIAL NEEDLE ASPIRATE:  Negative for carcinoma; lymphoid tissue present.  6.   LYMPH NODE, 4R, TRANSBRONCHIAL NEEDLE ASPIRATE:  Negative for carcinoma, lymphoid tissue present.     NM PET/CT scan performed at 7/13/22:   Hypermetabolic mediastinal and right hilar lymphadenopathy.  Differential considerations would include metastatic disease, lymphoma, or reactive lymphadenopathy.  Scattered less than 5 mm noncalcified and calcified pulmonary nodules.  These nodules are below the resolution of PET due to their small size.  Fleischner recommendations: If the patient is considered low risk, no routine follow-up.  If the patient is considered high risk, optional CT at 12 months.  Hepatic steatosis, sigmoid diverticulosis.   Moderate size fat-containing left inguinal hernia.      Assessment / Plan      Assessment / Plan:  Diagnoses and all orders for this visit:    1. Mediastinal lymphadenopathy (Primary)    2. Lung nodule       1. Mediastinal lymphadenopathy s/p EBUS 8/25/22 by Dr. Mcgowan: Patient denies any worsening SOA, cough, fatigue, unintentional weight loss, lymphadenopathy, hemoptysis, fevers, or chills.  Discussed results of EBUS with no evidence of carcinoma in 6 lymph node samples (report tissue present and every sample).  Recommended repeat CT chest in 3 to 6 months for surveillance.  Patient and family number would like to do this with Dr Mckenna, which is appropriate.  Will send EBUS results to Dr. Mckenna.  Patient will continue to follow with his office for surveillance imaging.  We will plan to see the patient back on an as-needed basis.  We are available for any future surgical needs.      Follow Up:   Return if symptoms worsen or fail to improve.   Or sooner for any further concerns or worsening sign and symptoms. If unable to reach us in the office please dial 911 or go to the nearest emergency department.      Melva ROGERS  [facility] Cardiothoracic Surgery    Time Spent: I spent 26 minutes caring for Wilner on this date of service. This time includes time spent by me in the following activities: preparing for the visit, reviewing tests, obtaining and/or reviewing a separately obtained history, performing a medically appropriate examination and/or evaluation, counseling and educating the patient/family/caregiver, documenting information in the medical record, independently interpreting results and communicating that information with the patient/family/caregiver and care coordination.

## (undated) DEVICE — SINGLE USE SUCTION VALVE MAJ-209: Brand: SINGLE USE SUCTION VALVE (STERILE)

## (undated) DEVICE — PENCL ROCKRSWCH MEGADYNE W/HOLSTR 10FT SS

## (undated) DEVICE — FRCP BX RADJAW4 PULM WO NDL STD1.8X2 100

## (undated) DEVICE — Device: Brand: SINGLE USE ASPIRATION NEEDLE NA-U401SX

## (undated) DEVICE — SHROD PENCL MEGADYNE ATTACHAVAC W/CONN/22MM

## (undated) DEVICE — SINGLE USE BIOPSY VALVE MAJ-210: Brand: SINGLE USE BIOPSY VALVE (STERILE)

## (undated) DEVICE — SENSR O2 OXIMAX FNGR A/ 18IN NONSTR

## (undated) DEVICE — Device: Brand: BALLOON

## (undated) DEVICE — STPCK 4WY ON/OFF VLV M/COLAR FIT 45PSI STRL

## (undated) DEVICE — TRAP,MUCUS SPECIMEN,40CC: Brand: MEDLINE

## (undated) DEVICE — ELECTRODE, ECG, FOAM, 3PK: Brand: MEDLINE

## (undated) DEVICE — SOL LR 1000ML

## (undated) DEVICE — BOWL UTIL STRL 32OZ

## (undated) DEVICE — ST IV INFUS ALARIS W/YSITE 2M LL 345CM 28ML 1P/U STRL

## (undated) DEVICE — TRAP FLD MINIVAC MEGADYNE 100ML